# Patient Record
Sex: FEMALE | Race: WHITE | NOT HISPANIC OR LATINO | URBAN - METROPOLITAN AREA
[De-identification: names, ages, dates, MRNs, and addresses within clinical notes are randomized per-mention and may not be internally consistent; named-entity substitution may affect disease eponyms.]

---
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
## 2023-09-18 ENCOUNTER — TELEPHONE (OUTPATIENT)
Dept: MEDICAL GROUP | Facility: CLINIC | Age: 23
End: 2023-09-18

## 2023-09-18 ENCOUNTER — TELEMEDICINE (OUTPATIENT)
Dept: MEDICAL GROUP | Facility: CLINIC | Age: 23
End: 2023-09-18

## 2023-09-18 DIAGNOSIS — F43.0 PANIC ATTACK AS REACTION TO STRESS: ICD-10-CM

## 2023-09-18 DIAGNOSIS — F41.0 PANIC ATTACK AS REACTION TO STRESS: ICD-10-CM

## 2023-09-18 PROCEDURE — 99213 OFFICE O/P EST LOW 20 MIN: CPT | Mod: 93 | Performed by: FAMILY MEDICINE

## 2023-09-18 RX ORDER — HYDROXYZINE 50 MG/1
TABLET, FILM COATED ORAL
Qty: 30 TABLET | Refills: 1 | Status: SHIPPED | OUTPATIENT
Start: 2023-09-18

## 2023-09-18 NOTE — ASSESSMENT & PLAN NOTE
Acute condition. Uncontrolled.  She has tried to seek medical care in [place] and is unable to be seen. She did get a prescription for propranolol to help decrease tachycardia and chest tightness.  This allowed her to calm down and take her finals.

## 2023-09-18 NOTE — PROGRESS NOTES
"  HPI:  Patient is a 23 y.o. female. This pleasant patient is by telephone as zoom did not work. 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  She was planning to take a gap year, but then was accepted to medical school. She has had a \"harsh transition.\"  She has an apartment by herself. She can do all ADLs independently.                                                                                                                                 Patient Active Problem List    Diagnosis Date Noted    Panic attack as reaction to stress 09/18/2023       Current Outpatient Medications   Medication Sig Dispense Refill    sertraline (ZOLOFT) 50 MG Tab 1/2 tablet by mouth daily x 7 days, then one po qd 90 Tablet 3    hydrOXYzine HCl (ATARAX) 50 MG Tab Take 1/2 pill po as needed for anxiety.  May repeat dose if anxiety persists more than 30 minutes. 30 Tablet 1    levonorgestrel (MIRENA) 20 MCG/DAY IUD 1 Each by Intrauterine route.       No current facility-administered medications for this visit.         Allergies as of 09/18/2023 - Reviewed 09/18/2023   Allergen Reaction Noted    Amoxicillin Hives 03/01/2018          There were no vitals taken for this visit.    Gen: no fevers/chills, no changes in weight  Eyes: no changes in vision  ENT: no sore throat, no hearing loss, no bloody nose  Pulm: no sob, no cough  CV: no chest pain  GI: no nausea/vomiting, no diarrhea  : no dysuria or flank pain  MSk: no myalgias  Skin: no rash  Psych: see HPI  Neuro: no headaches, no numbness/tingling  Heme/Lymph: no easy bruising or bleeding    No physical exam as this was a telephone visit.       Assessment and Plan    23 y.o. female with the following-  Problem List Items Addressed This Visit       Panic attack as reaction to stress     Acute condition. Uncontrolled.  She has tried to seek medical care in [place] and is unable to be seen. She did get a prescription for propranolol to help decrease tachycardia and chest " tightness.  This allowed her to calm down and take her finals. Will start exercise regimen and meditation.          Relevant Medications    sertraline (ZOLOFT) 50 MG Tab    hydrOXYzine HCl (ATARAX) 50 MG Tab     Patient will follow up with me by [place] within the next month.

## 2024-06-17 ENCOUNTER — PATIENT MESSAGE (OUTPATIENT)
Dept: MEDICAL GROUP | Facility: CLINIC | Age: 24
End: 2024-06-17

## 2024-06-18 ENCOUNTER — TELEPHONE (OUTPATIENT)
Dept: MEDICAL GROUP | Facility: CLINIC | Age: 24
End: 2024-06-18

## 2024-06-18 ENCOUNTER — PATIENT MESSAGE (OUTPATIENT)
Dept: MEDICAL GROUP | Facility: CLINIC | Age: 24
End: 2024-06-18

## 2024-06-18 DIAGNOSIS — R52 PAIN: ICD-10-CM

## 2024-06-18 NOTE — TELEPHONE ENCOUNTER
Phone Number Called: [phone] (home) [phone] (work)     Call outcome: Left detailed message for patient. Informed to call back with any additional questions.    Message:   Attempted to call pt. LM for Alondra to come and do Mirena IUD  paper work , bring insurance and ID Card.

## 2024-06-20 ENCOUNTER — PATIENT MESSAGE (OUTPATIENT)
Dept: MEDICAL GROUP | Facility: CLINIC | Age: 24
End: 2024-06-20

## 2024-06-20 RX ORDER — OXYCODONE HYDROCHLORIDE 5 MG/1
5 TABLET ORAL EVERY 4 HOURS PRN
Qty: 30 TABLET | Refills: 0 | Status: SHIPPED | OUTPATIENT
Start: 2024-06-20 | End: 2024-06-25

## 2024-06-27 ENCOUNTER — PATIENT MESSAGE (OUTPATIENT)
Dept: MEDICAL GROUP | Facility: CLINIC | Age: 24
End: 2024-06-27

## 2024-07-12 ENCOUNTER — PATIENT MESSAGE (OUTPATIENT)
Dept: MEDICAL GROUP | Facility: CLINIC | Age: 24
End: 2024-07-12
Payer: COMMERCIAL

## 2024-07-15 ENCOUNTER — APPOINTMENT (OUTPATIENT)
Dept: MEDICAL GROUP | Facility: CLINIC | Age: 24
End: 2024-07-15
Payer: COMMERCIAL

## 2024-07-16 ENCOUNTER — OFFICE VISIT (OUTPATIENT)
Dept: MEDICAL GROUP | Facility: CLINIC | Age: 24
End: 2024-07-16
Payer: COMMERCIAL

## 2024-07-16 VITALS
WEIGHT: 150.2 LBS | HEIGHT: 68 IN | BODY MASS INDEX: 22.76 KG/M2 | DIASTOLIC BLOOD PRESSURE: 72 MMHG | TEMPERATURE: 98.1 F | SYSTOLIC BLOOD PRESSURE: 121 MMHG | HEART RATE: 89 BPM

## 2024-07-16 DIAGNOSIS — Z97.5 IUD CONTRACEPTION: ICD-10-CM

## 2024-07-16 PROCEDURE — 58301 REMOVE INTRAUTERINE DEVICE: CPT | Mod: GC

## 2024-07-16 PROCEDURE — 58300 INSERT INTRAUTERINE DEVICE: CPT | Mod: GC

## 2024-07-16 ASSESSMENT — PATIENT HEALTH QUESTIONNAIRE - PHQ9: CLINICAL INTERPRETATION OF PHQ2 SCORE: 0

## 2025-05-27 ENCOUNTER — OFFICE VISIT (OUTPATIENT)
Dept: MEDICAL GROUP | Facility: CLINIC | Age: 25
End: 2025-05-27
Payer: COMMERCIAL

## 2025-05-27 ENCOUNTER — HOSPITAL ENCOUNTER (OUTPATIENT)
Dept: LAB | Facility: MEDICAL CENTER | Age: 25
End: 2025-05-27
Attending: FAMILY MEDICINE
Payer: COMMERCIAL

## 2025-05-27 VITALS
WEIGHT: 158 LBS | TEMPERATURE: 98.6 F | HEART RATE: 82 BPM | HEIGHT: 68 IN | SYSTOLIC BLOOD PRESSURE: 109 MMHG | OXYGEN SATURATION: 96 % | BODY MASS INDEX: 23.95 KG/M2 | DIASTOLIC BLOOD PRESSURE: 71 MMHG | RESPIRATION RATE: 16 BRPM

## 2025-05-27 DIAGNOSIS — Z00.00 WELL ADULT EXAM: ICD-10-CM

## 2025-05-27 DIAGNOSIS — Z11.1 SCREENING FOR TUBERCULOSIS: ICD-10-CM

## 2025-05-27 DIAGNOSIS — Z11.59 NEED FOR HEPATITIS C SCREENING TEST: ICD-10-CM

## 2025-05-27 DIAGNOSIS — H60.311 CHRONIC DIFFUSE OTITIS EXTERNA OF RIGHT EAR: ICD-10-CM

## 2025-05-27 DIAGNOSIS — Z11.1 SCREENING FOR TUBERCULOSIS: Primary | ICD-10-CM

## 2025-05-27 DIAGNOSIS — Z11.4 SCREENING FOR HIV (HUMAN IMMUNODEFICIENCY VIRUS): ICD-10-CM

## 2025-05-27 LAB
HCV AB SER QL: NORMAL
HIV 1+2 AB+HIV1 P24 AG SERPL QL IA: NORMAL

## 2025-05-27 PROCEDURE — 86480 TB TEST CELL IMMUN MEASURE: CPT

## 2025-05-27 PROCEDURE — 87389 HIV-1 AG W/HIV-1&-2 AB AG IA: CPT

## 2025-05-27 PROCEDURE — 36415 COLL VENOUS BLD VENIPUNCTURE: CPT

## 2025-05-27 PROCEDURE — 86803 HEPATITIS C AB TEST: CPT

## 2025-05-27 ASSESSMENT — PATIENT HEALTH QUESTIONNAIRE - PHQ9: CLINICAL INTERPRETATION OF PHQ2 SCORE: 0

## 2025-05-27 NOTE — ASSESSMENT & PLAN NOTE
Patient doing well.  I discussed with the patient the importance of covid vaccine and flu vaccine every fall.  Risks and benefits of these screenings discussed at length.  Patient questions answered.

## 2025-05-27 NOTE — PROGRESS NOTES
Subjective:     CC:   Chief Complaint   Patient presents with    Requesting Labs     TB testing        HPI:   Alondra Espinoza is a 25 y.o. patient who presents for annual exam. The patient is feeling well and denies any complaints.    Problem   Chronic Diffuse Otitis Externa of Right Ear    Pain in right ear.  Given antibiotic and steroid ear drop.  She continues to feel discomfort.  She hears crackling in her right ear with swallowing.       Well Adult Exam    Doing well.  Has Liletta IUD inserted  in [place].  Doing well.  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  Good mood.               Health Maintenance  Advanced directive: n/a   Cholesterol Screening: not in chart   Diabetes Screening: FBS 76    Aspirin Use: n/a    Diet: healthy   Exercise: exercises   Substance Abuse: none   Safe in relationship. Feels safe  Seat belts, bike helmet, gun safety discussed.  Sun protection used.    Cancer screening  Colorectal Cancer Screening: n/a    Lung Cancer Screening: never smoker      Infectious disease screening/Immunizations  --STI Screenin negative. With a male partner x 2 years.    --Practices safe sex.  --HIV Screening: no recent results   --Hepatitis C Screening: no  recent results   --Immunizations:    Influenza: flu yearly    HPV:  Had HPV 2 doses.     Tetanus: Tdap     Shingles: n/a    Pneumococcal : n/a                COVID-19: gets this yearly.  Other immunizations: none needed.      He  has no past medical history on file.  He   has no past surgical history on file.    History reviewed. No pertinent family history.    Social History[1]    Patient Active Problem List    Diagnosis Date Noted    Chronic diffuse otitis externa of right ear 2025    Well adult exam 2025    Panic attack as reaction to stress 2023     Past Medical History[2]  Past Surgical History[3]    Current Medications[4]  Allergies[5]    Review of Systems   Constitutional: Negative for fever,  "chills and malaise/fatigue.   HENT: Negative for congestion.    Eyes: Negative for pain.    Respiratory: Negative for cough and shortness of breath.  Cardiovascular: Negative for leg swelling.   Gastrointestinal: Negative for nausea, vomiting, abdominal pain and diarrhea.   Genitourinary: Negative for dysuria and hematuria.   Skin: Negative for rash.   Neurological: Negative for dizziness, focal weakness and headaches.   Endo/Heme/Allergies: Does not bleed easily.   Psychiatric/Behavioral: Negative for depression.  The patient is not nervous/anxious.      Objective:     /71 (BP Location: Left arm, Patient Position: Sitting, BP Cuff Size: Adult)   Pulse 82   Temp 37 °C (98.6 °F) (Temporal)   Resp 16   Ht 1.727 m (5' 8\")   Wt 71.7 kg (158 lb)   SpO2 96%   BMI 24.02 kg/m²   Body mass index is 24.02 kg/m².  Wt Readings from Last 4 Encounters:   05/27/25 71.7 kg (158 lb)   07/16/24 68.1 kg (150 lb 3.2 oz)   03/28/19 59.3 kg (130 lb 12.8 oz) (62%, Z= 0.32)*   03/01/18 58.1 kg (128 lb) (62%, Z= 0.31)*     * Growth percentiles are based on CDC (Girls, 2-20 Years) data.       Physical Exam:  Constitutional: Well-developed and well-nourished. Not diaphoretic. No distress.   Skin: Skin is warm and dry. No rash noted.  Head: Atraumatic without lesions.  Eyes: Conjunctivae and extraocular motions are normal. Pupils are equal, round, and reactive to light. No scleral icterus.   Ears:  External ears unremarkable. Tympanic membranes clear and intact.  Nose: Nares patent. Septum midline. Turbinates without erythema nor edema. No discharge.   Mouth/Throat: Dentition is good. Tongue normal. Oropharynx is clear and moist. Posterior pharynx without erythema or exudates.  Neck: Supple, trachea midline. Normal range of motion. No thyromegaly present. No lymphadenopathy--cervical or supraclavicular.  Cardiovascular: Regular rate and rhythm, S1 and S2 without murmur, rubs, or gallops.  Lungs: Normal inspiratory effort, CTA " bilaterally, no wheezes/rhonchi/rales  Abdomen: Soft, non tender, and without distention. Active bowel sounds in all four quadrants. No rebound, guarding, masses or HSM.  Extremities: No cyanosis, clubbing, erythema, nor edema. Distal pulses intact and symmetric.   Musculoskeletal: All major joints AROM full in all directions without pain.  Neurological: Alert and oriented x 3. DTRs 2+/3 and symmetric. No cranial nerve deficit. 5/5 myotomes. Sensation intact.   Psychiatric:  Behavior, mood, and affect are appropriate.    Quantitative hep B surface antibody 7/21/2023 less than 3.1 iU/ml      Assessment and Plan:     1. Screening for tuberculosis  Quantiferon Gold Plus TB (4 tube)      2. Screening for HIV (human immunodeficiency virus)  HIV AG/AB COMBO ASSAY SCREENING      3. Need for hepatitis C screening test  HEP C VIRUS ANTIBODY      4. Chronic diffuse otitis externa of right ear        5. Well adult exam            Problem List Items Addressed This Visit       Chronic diffuse otitis externa of right ear    Well adult exam    Patient doing well.  I discussed with the patient the importance of covid vaccine and flu vaccine every fall.  Risks and benefits of these screenings discussed at length.  Patient questions answered.             Other Visit Diagnoses         Screening for tuberculosis    -  Primary    Relevant Orders    Quantiferon Gold Plus TB (4 tube)      Screening for HIV (human immunodeficiency virus)        Relevant Orders    HIV AG/AB COMBO ASSAY SCREENING      Need for hepatitis C screening test        Relevant Orders    HEP C VIRUS ANTIBODY            Labs per orders  Immunizations per orders  Orders Placed This Encounter    HIV AG/AB COMBO ASSAY SCREENING    HEP C VIRUS ANTIBODY    Quantiferon Gold Plus TB (4 tube)       Patient counseled about skin care, diet, supplements, prenatal vitamins, safe sex and exercise.      Follow-up: No follow-ups on file.           [1]   Social History  Tobacco Use     Smoking status: Never    Smokeless tobacco: Never   Vaping Use    Vaping status: Never Used   Substance Use Topics    Alcohol use: Yes     Alcohol/week: 0.6 oz     Types: 1 Glasses of wine per week     Comment: a glass of wine twice a week    Drug use: Never   [2] History reviewed. No pertinent past medical history.  [3] History reviewed. No pertinent surgical history.  [4]   Current Outpatient Medications   Medication Sig Dispense Refill    levonorgestrel (MIRENA) 20 MCG/DAY IUD 1 Each by Intrauterine route.      sertraline (ZOLOFT) 50 MG Tab 1/2 tablet by mouth daily x 7 days, then one po qd 90 Tablet 3    hydrOXYzine HCl (ATARAX) 50 MG Tab Take 1/2 pill po as needed for anxiety.  May repeat dose if anxiety persists more than 30 minutes. 30 Tablet 1     No current facility-administered medications for this visit.   [5]   Allergies  Allergen Reactions    Amoxicillin Hives     body

## 2025-05-29 LAB
GAMMA INTERFERON BACKGROUND BLD IA-ACNC: 0.03 IU/ML
M TB IFN-G BLD-IMP: NEGATIVE
M TB IFN-G CD4+ BCKGRND COR BLD-ACNC: 0 IU/ML
MITOGEN IGNF BCKGRD COR BLD-ACNC: >10 IU/ML
QFT TB2 - NIL TBQ2: 0 IU/ML

## 2025-05-30 ENCOUNTER — RESULTS FOLLOW-UP (OUTPATIENT)
Dept: MEDICAL GROUP | Facility: CLINIC | Age: 25
End: 2025-05-30

## 2025-05-30 NOTE — RESULT ENCOUNTER NOTE
Dear Alondra,    I have reviewed your labs. They are all normal.    HIV negative.  Hepatitis C negative.  Quantiferon gold test negative.     Best,   Kaylene Leone MD